# Patient Record
Sex: FEMALE | Race: BLACK OR AFRICAN AMERICAN | Employment: OTHER | URBAN - METROPOLITAN AREA
[De-identification: names, ages, dates, MRNs, and addresses within clinical notes are randomized per-mention and may not be internally consistent; named-entity substitution may affect disease eponyms.]

---

## 2017-01-09 ENCOUNTER — HOSPITAL ENCOUNTER (OUTPATIENT)
Dept: WOUND CARE | Facility: CLINIC | Age: 62
Discharge: HOME | End: 2017-01-09
Attending: PHYSICIAN ASSISTANT | Admitting: PHYSICIAN ASSISTANT
Payer: COMMERCIAL

## 2017-01-09 DIAGNOSIS — T81.89XD SURGICAL WOUND, NON HEALING, SUBSEQUENT ENCOUNTER: ICD-10-CM

## 2017-01-09 DIAGNOSIS — M25.474 SWELLING OF FOOT JOINT, RIGHT: Primary | ICD-10-CM

## 2017-01-09 PROCEDURE — 11042 DBRDMT SUBQ TIS 1ST 20SQCM/<: CPT | Performed by: PHYSICIAN ASSISTANT

## 2017-01-09 PROCEDURE — 11042 DBRDMT SUBQ TIS 1ST 20SQCM/<: CPT

## 2017-01-09 PROCEDURE — A6248 HYDROGEL DRSG GEL FILLER: HCPCS

## 2017-01-10 NOTE — PROGRESS NOTES
[facility]      HISTORY OF PRESENT ILLNESS:  Ms. Maria T Suazo returns to the [facility] for a followup visit of her right medial first metatarsal wound.  Ms. Suaoz underwent a bunionectomy on 08/30/2016.  She had a wound dehiscence following suture removal.  This wound became infected.  She was treated with IV antibiotics.  Since she was seen last, she reports the wound has improved.  She has a home nurse that assists with dressing changes.  She has recently changed to Woun'Dres gel which she changes every day.      PHYSICAL EXAMINATION:   GENERAL:  Ms. Suazo is a 61-year-old woman in no acute distress.   VITAL SIGNS:  Blood pressure 114/67, pulse 91, respirations 16, temperature 98 degrees Fahrenheit.   EXTREMITIES:  Her foot is warm with normal color.  She denies pain over the area with palpation.  The right medial foot wound measured 0.5 x 0.2 x 0 cm deep.  The exposed bone previously seen has now been covered by tissue.  There appears to be a fibrin plug in the central area of this wound.  There was no evidence of infection or purulent drainage.      PROCEDURE:  Informed consent was discussed and the wound site was identified.  Lidocaine 4% topical was applied.  Using a #15 blade and Adson pickup, the edges of the wound were sharply debrided.  The central area of the wound was then debrided to the subcutaneous level with healthy granulation tissue in the base of the wound.  There was scant bleeding with this procedure.  She tolerated it without difficulty.      ASSESSMENT:  Surgical dehiscence with subsequent infection at the right first metatarsophalangeal joint, status post right bunionectomy.      DISCUSSION:  This wound continues to make improvement.  The exposed bone is now covered with healthy, viable tissue.  There is no sign of infection.  She will continue to clean the wound daily with soap and water, followed by a dressing of Woun'Dres gel.  This will be  covered with a Band-Aid and dressings will be changed every day.  Ms. Suazo also reports having swelling in her right foot and ankle.  We discussed utilization of compression stockings and a prescription was provided for her.  She will also elevate her legs 3 times daily above her heart for 30 minutes.  Ms. Suazo was in agreement with this plan and had no further questions.  She will follow up as needed for further wound care.         TINO WOLFE PA-C             D: 2017 15:08   T: 2017 21:21   MT: JOSE      Name:     WADE SUAZO   MRN:      [id number]        Account:      [id number]   :      1955           Visit Date:   2017      Document: [id number]

## 2017-02-28 ENCOUNTER — OFFICE VISIT (OUTPATIENT)
Dept: ORTHOPEDICS | Facility: CLINIC | Age: 62
End: 2017-02-28
Payer: COMMERCIAL

## 2017-02-28 VITALS
SYSTOLIC BLOOD PRESSURE: 96 MMHG | DIASTOLIC BLOOD PRESSURE: 60 MMHG | HEIGHT: 67 IN | BODY MASS INDEX: 21.19 KG/M2 | WEIGHT: 135 LBS

## 2017-02-28 DIAGNOSIS — Z98.890 STATUS POST BUNIONECTOMY: ICD-10-CM

## 2017-02-28 DIAGNOSIS — M19.079 ARTHRITIS OF BIG TOE: Primary | ICD-10-CM

## 2017-02-28 PROCEDURE — 99213 OFFICE O/P EST LOW 20 MIN: CPT | Performed by: ORTHOPAEDIC SURGERY

## 2017-02-28 NOTE — MR AVS SNAPSHOT
"              After Visit Summary   2/28/2017    Maria T Suazo    MRN: [id number]           Patient Information     Date Of Birth          1955        Visit Information        Provider Department      2/28/2017 11:00 AM Abisai Marie MD [facility]        Today's Diagnoses     Arthritis of big toe    -  1    Status post bunionectomy           Follow-ups after your visit        Who to contact     If you have questions or need follow up information about today's clinic visit or your schedule please contact [facility] directly at [phone].  Normal or non-critical lab and imaging results will be communicated to you by We[employer], letter or phone within 4 business days after the clinic has received the results. If you do not hear from us within 7 days, please contact the clinic through Bizmore[employer] or phone. If you have a critical or abnormal lab result, we will notify you by phone as soon as possible.  Submit refill requests through [employer] or call your pharmacy and they will forward the refill request to us. Please allow 3 business days for your refill to be completed.          Additional Information About Your Visit        MyChart Information     [employer] gives you secure access to your electronic health record. If you see a primary care provider, you can also send messages to your care team and make appointments. If you have questions, please call your primary care clinic.  If you do not have a primary care provider, please call [phone] and they will assist you.        Care EveryWhere ID     This is your Care EveryWhere ID. This could be used by other organizations to access your [facility] medical records  [id number]        Your Vitals Were     Height BMI (Body Mass Index)                1.702 m (5' 7\") 21.14 kg/m2           Blood Pressure from Last 3 Encounters:   02/28/17 96/60   11/23/16 128/68   10/26/16 126/68    Weight from Last 3 Encounters:   02/28/17 " 61.2 kg (135 lb)   11/23/16 66.2 kg (146 lb)   10/26/16 66.2 kg (146 lb)              Today, you had the following     No orders found for display       Primary Care Provider Office Phone # Fax #    Ha Cook PA-C [phone]       [facility] [address]   [facility] [address]        Thank you!     Thank you for choosing [facility]  for your care. Our goal is always to provide you with excellent care. Hearing back from our patients is one way we can continue to improve our services. Please take a few minutes to complete the written survey that you may receive in the mail after your visit with us. Thank you!             Your Updated Medication List - Protect others around you: Learn how to safely use, store and throw away your medicines at www.disposemymeds.org.          This list is accurate as of: 2/28/17 12:06 PM.  Always use your most recent med list.                   Brand Name Dispense Instructions for use    ASPIRIN PO      Take 81 mg by mouth       BIOTIN PO      Take 10,000 mg by mouth daily       ibuprofen 800 MG tablet    ADVIL/MOTRIN     Take 400 mg by mouth every 8 hours as needed (headache)       lisinopril 10 MG tablet    PRINIVIL/ZESTRIL     Take 10 mg by mouth daily       MELATONIN PO      Take 3 mg by mouth nightly as needed       metFORMIN 500 MG tablet    GLUCOPHAGE     Take 500 mg by mouth 2 times daily (with meals)       NORTRIPTYLINE HCL PO      Take 20-30 mg by mouth At Bedtime       * order for DME     1 Units    Equipment being ordered: raised toilet seat.       * order for DME     1 Units    Equipment being ordered: Shower chair       * order for DME     4 Device    Equipment being ordered: Compression Socks Knee High (Closed or Open Toe) 20-30 mmhg       ROSUVASTATIN CALCIUM PO      Take 20 mg by mouth daily       venlafaxine 150 MG 24 hr capsule    EFFEXOR-XR     Take 150 mg by mouth daily       VITAMIN C &  D3/ABIODUN HIPS PO      Take by mouth daily       VITAMIN D PO      Take 1,000 mg by mouth daily       zinc 50 MG Tabs      Take 1 tablet by mouth daily as needed       * Notice:  This list has 3 medication(s) that are the same as other medications prescribed for you. Read the directions carefully, and ask your doctor or other care provider to review them with you.

## 2017-02-28 NOTE — NURSING NOTE
"Chief Complaint   Patient presents with     Foot Problems     right,        Initial BP 96/60  Ht 5' 7\" (1.702 m)  Wt 135 lb (61.2 kg)  BMI 21.14 kg/m2 Estimated body mass index is 21.14 kg/(m^2) as calculated from the following:    Height as of this encounter: 5' 7\" (1.702 m).    Weight as of this encounter: 135 lb (61.2 kg).  Medication Reconciliation: complete     Nallely Trujillo, ATC      "

## 2017-02-28 NOTE — PROGRESS NOTES
"HISTORY OF PRESENT ILLNESS:    Maria T Suazo is a 62 year old female with non insulin dependent Type II DM, S/P Rt 1st MCP bunionectomy, Met osteotomy, Cheilectomy, DOS 8/30/2016.  Present symptoms: right great toe pain with swelling, also has concerns about blisters that formed over incision after starting to wear shoes in the last few weeks. She note the incisions completely closed up about 1 week ago. She is requesting a blood test to see if she has any bone infections due to the length of time it took for the incision to heal.  Treatments tried to this point: soaking in Epsion salt water  Past Medical History: Unchanged from the visit of 8/4/2016. Please refer to that note.    REVIEW OF SYSTEMS:  CONSTITUTIONAL:  Lost of Appetite, Weight Loss NEGATIVE for fever, chills, INTEGUMENTARY/SKIN:  NEGATIVE for worrisome rashes, moles or lesions  EYES:  NEGATIVE for vision changes or irritation  ENT/MOUTH:  NEGATIVE for ear, mouth and throat problems  RESP:  NEGATIVE for significant cough or SOB  BREAST:  NEGATIVE for masses, tenderness or discharge  CV:  Hypertension, chest pain   GI:  NEGATIVE for nausea, abdominal pain, heartburn, or change in bowel habits  :  Negative   MUSCULOSKELETAL:  See HPI above  NEURO: Seizures (x1), Dizziness  ENDOCRINE:  NEGATIVE for temperature intolerance, skin/hair changes  HEME/ALLERGY/IMMUNE:  NEGATIVE for bleeding problems  PSYCHIATRIC:  Depression, Panic Attacks    PHYSICAL EXAM:  BP 96/60  Ht 5' 7\" (1.702 m)  Wt 135 lb (61.2 kg)  BMI 21.14 kg/m2  Body mass index is 21.14 kg/(m^2).   GENERAL APPEARANCE: healthy, alert and no distress   SKIN: no suspicious lesions or rashes  NEURO: Normal strength and tone, mentation intact and speech normal  VASCULAR:  good pulses, and cappillary refill   LYMPH: no lymphadenopathy   PSYCH:  mentation appears normal and affect normal/bright    MSK:  Completely healed incision at this point by secondary intention, right foot  Limited dorsiflexion of " the MTP joint Of the big toe secondary to DJD which is chronic  No focal tenderness with palpation  No drainage is noted  No significant swelling is noted  No erythema is noted  The foot alignment is very satisfactory with a good correction of the bunion    IMAGING INTERPRETATION:  None taken today     ASSESSMENT:  Status post bunion correction, right  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 Within last week or so the wound has closed completely.  Even though it has taken a long period time for that to happen, at this point, she does not have any indication of having ongoing infection.  Further observation is most appropriate.  Follow-up as needed.           Abisai Marie MD  Dept. Orthopedic Surgery  [facility]       Disclaimer: This note consists of symbols derived from keyboarding, dictation and/or voice recognition software. As a result, there may be errors in the script that have gone undetected. Please consider this when interpreting information found in this chart.

## 2017-04-13 PROCEDURE — 96361 HYDRATE IV INFUSION ADD-ON: CPT

## 2017-04-13 PROCEDURE — 96374 THER/PROPH/DIAG INJ IV PUSH: CPT

## 2017-04-13 PROCEDURE — 25000125 ZZHC RX 250: Performed by: EMERGENCY MEDICINE

## 2017-04-13 PROCEDURE — 99285 EMERGENCY DEPT VISIT HI MDM: CPT | Mod: 25

## 2017-04-13 PROCEDURE — 96375 TX/PRO/DX INJ NEW DRUG ADDON: CPT

## 2017-04-13 RX ORDER — ONDANSETRON 4 MG/1
4 TABLET, ORALLY DISINTEGRATING ORAL ONCE
Status: COMPLETED | OUTPATIENT
Start: 2017-04-13 | End: 2017-04-13

## 2017-04-13 RX ADMIN — ONDANSETRON 4 MG: 4 TABLET, ORALLY DISINTEGRATING ORAL at 22:44

## 2017-04-14 ENCOUNTER — HOSPITAL ENCOUNTER (EMERGENCY)
Facility: CLINIC | Age: 62
Discharge: HOME | End: 2017-04-14
Attending: EMERGENCY MEDICINE | Admitting: EMERGENCY MEDICINE
Payer: COMMERCIAL

## 2017-04-14 ENCOUNTER — APPOINTMENT (OUTPATIENT)
Dept: CT IMAGING | Facility: CLINIC | Age: 62
End: 2017-04-14
Attending: EMERGENCY MEDICINE
Payer: COMMERCIAL

## 2017-04-14 VITALS
BODY MASS INDEX: 21.35 KG/M2 | RESPIRATION RATE: 19 BRPM | SYSTOLIC BLOOD PRESSURE: 133 MMHG | HEART RATE: 78 BPM | WEIGHT: 136 LBS | OXYGEN SATURATION: 100 % | DIASTOLIC BLOOD PRESSURE: 75 MMHG | HEIGHT: 67 IN | TEMPERATURE: 97.9 F

## 2017-04-14 DIAGNOSIS — R11.2 NON-INTRACTABLE VOMITING WITH NAUSEA, UNSPECIFIED VOMITING TYPE: ICD-10-CM

## 2017-04-14 DIAGNOSIS — R51.9 NONINTRACTABLE EPISODIC HEADACHE, UNSPECIFIED HEADACHE TYPE: ICD-10-CM

## 2017-04-14 LAB
ALBUMIN SERPL-MCNC: 3.8 G/DL (ref 3.4–5)
ALP SERPL-CCNC: 93 U/L (ref 40–150)
ALT SERPL W P-5'-P-CCNC: 22 U/L (ref 0–50)
ANION GAP SERPL CALCULATED.3IONS-SCNC: 7 MMOL/L (ref 3–14)
AST SERPL W P-5'-P-CCNC: 19 U/L (ref 0–45)
BASOPHILS # BLD AUTO: 0 10E9/L (ref 0–0.2)
BASOPHILS NFR BLD AUTO: 0.3 %
BILIRUB SERPL-MCNC: 0.4 MG/DL (ref 0.2–1.3)
BUN SERPL-MCNC: 12 MG/DL (ref 7–30)
CALCIUM SERPL-MCNC: 9.5 MG/DL (ref 8.5–10.1)
CHLORIDE SERPL-SCNC: 101 MMOL/L (ref 94–109)
CO2 SERPL-SCNC: 30 MMOL/L (ref 20–32)
CREAT SERPL-MCNC: 0.79 MG/DL (ref 0.52–1.04)
DIFFERENTIAL METHOD BLD: ABNORMAL
EOSINOPHIL # BLD AUTO: 0.1 10E9/L (ref 0–0.7)
EOSINOPHIL NFR BLD AUTO: 2 %
ERYTHROCYTE [DISTWIDTH] IN BLOOD BY AUTOMATED COUNT: 13.5 % (ref 10–15)
GFR SERPL CREATININE-BSD FRML MDRD: 73 ML/MIN/1.7M2
GLUCOSE SERPL-MCNC: 89 MG/DL (ref 70–99)
HCT VFR BLD AUTO: 40.3 % (ref 35–47)
HGB BLD-MCNC: 13.6 G/DL (ref 11.7–15.7)
IMM GRANULOCYTES # BLD: 0 10E9/L (ref 0–0.4)
IMM GRANULOCYTES NFR BLD: 0 %
INTERPRETATION ECG - MUSE: NORMAL
LIPASE SERPL-CCNC: 88 U/L (ref 73–393)
LYMPHOCYTES # BLD AUTO: 1.6 10E9/L (ref 0.8–5.3)
LYMPHOCYTES NFR BLD AUTO: 43.9 %
MCH RBC QN AUTO: 31.2 PG (ref 26.5–33)
MCHC RBC AUTO-ENTMCNC: 33.7 G/DL (ref 31.5–36.5)
MCV RBC AUTO: 92 FL (ref 78–100)
MONOCYTES # BLD AUTO: 0.3 10E9/L (ref 0–1.3)
MONOCYTES NFR BLD AUTO: 8.5 %
NEUTROPHILS # BLD AUTO: 1.6 10E9/L (ref 1.6–8.3)
NEUTROPHILS NFR BLD AUTO: 45.3 %
NRBC # BLD AUTO: 0 10*3/UL
NRBC BLD AUTO-RTO: 0 /100
PLATELET # BLD AUTO: 252 10E9/L (ref 150–450)
POTASSIUM SERPL-SCNC: 4 MMOL/L (ref 3.4–5.3)
PROT SERPL-MCNC: 8.1 G/DL (ref 6.8–8.8)
RBC # BLD AUTO: 4.36 10E12/L (ref 3.8–5.2)
SODIUM SERPL-SCNC: 138 MMOL/L (ref 133–144)
TROPONIN I SERPL-MCNC: NORMAL UG/L (ref 0–0.04)
WBC # BLD AUTO: 3.5 10E9/L (ref 4–11)

## 2017-04-14 PROCEDURE — 84484 ASSAY OF TROPONIN QUANT: CPT | Performed by: EMERGENCY MEDICINE

## 2017-04-14 PROCEDURE — 80053 COMPREHEN METABOLIC PANEL: CPT | Performed by: EMERGENCY MEDICINE

## 2017-04-14 PROCEDURE — 25000128 H RX IP 250 OP 636: Performed by: EMERGENCY MEDICINE

## 2017-04-14 PROCEDURE — 70450 CT HEAD/BRAIN W/O DYE: CPT

## 2017-04-14 PROCEDURE — 85025 COMPLETE CBC W/AUTO DIFF WBC: CPT | Performed by: EMERGENCY MEDICINE

## 2017-04-14 PROCEDURE — 83690 ASSAY OF LIPASE: CPT | Performed by: EMERGENCY MEDICINE

## 2017-04-14 RX ORDER — METOCLOPRAMIDE HYDROCHLORIDE 5 MG/ML
10 INJECTION INTRAMUSCULAR; INTRAVENOUS ONCE
Status: COMPLETED | OUTPATIENT
Start: 2017-04-14 | End: 2017-04-14

## 2017-04-14 RX ORDER — ONDANSETRON 4 MG/1
4 TABLET, ORALLY DISINTEGRATING ORAL EVERY 8 HOURS PRN
Qty: 10 TABLET | Refills: 0 | Status: SHIPPED | OUTPATIENT
Start: 2017-04-14 | End: 2017-04-17

## 2017-04-14 RX ORDER — DIPHENHYDRAMINE HYDROCHLORIDE 50 MG/ML
25 INJECTION INTRAMUSCULAR; INTRAVENOUS ONCE
Status: COMPLETED | OUTPATIENT
Start: 2017-04-14 | End: 2017-04-14

## 2017-04-14 RX ORDER — KETOROLAC TROMETHAMINE 15 MG/ML
15 INJECTION, SOLUTION INTRAMUSCULAR; INTRAVENOUS ONCE
Status: COMPLETED | OUTPATIENT
Start: 2017-04-14 | End: 2017-04-14

## 2017-04-14 RX ORDER — METOCLOPRAMIDE 10 MG/1
10 TABLET ORAL 4 TIMES DAILY PRN
Qty: 20 TABLET | Refills: 0 | Status: SHIPPED | OUTPATIENT
Start: 2017-04-14

## 2017-04-14 RX ADMIN — SODIUM CHLORIDE 1000 ML: 9 INJECTION, SOLUTION INTRAVENOUS at 02:30

## 2017-04-14 RX ADMIN — DIPHENHYDRAMINE HYDROCHLORIDE 25 MG: 50 INJECTION, SOLUTION INTRAMUSCULAR; INTRAVENOUS at 02:30

## 2017-04-14 RX ADMIN — KETOROLAC TROMETHAMINE 15 MG: 15 INJECTION, SOLUTION INTRAMUSCULAR; INTRAVENOUS at 02:35

## 2017-04-14 RX ADMIN — METOCLOPRAMIDE 10 MG: 5 INJECTION, SOLUTION INTRAMUSCULAR; INTRAVENOUS at 02:33

## 2017-04-14 ASSESSMENT — ENCOUNTER SYMPTOMS
SHORTNESS OF BREATH: 1
WEAKNESS: 0
VOMITING: 1
LIGHT-HEADEDNESS: 1
HEADACHES: 1
DIARRHEA: 1
PALPITATIONS: 1
NAUSEA: 1
ABDOMINAL PAIN: 1

## 2017-04-14 NOTE — ED AVS SNAPSHOT
Emergency Department    [address] [place] [address]    Phone:  [phone]    Fax:  [phone]                                       Maria T Suazo   MRN: [id number]    Department:   Emergency Department   Date of Visit:  4/13/2017           After Visit Summary Signature Page     I have received my discharge instructions, and my questions have been answered. I have discussed any challenges I see with this plan with the nurse or doctor.    ..........................................................................................................................................  Patient/Patient Representative Signature      ..........................................................................................................................................  Patient Representative Print Name and Relationship to Patient    ..................................................               ................................................  Date                                            Time    ..........................................................................................................................................  Reviewed by Signature/Title    ...................................................              ..............................................  Date                                                            Time

## 2017-04-14 NOTE — ED AVS SNAPSHOT
Emergency Department    [address] [facility] [address]    Phone:  [phone]    Fax:  [phone]                                       Maria T Suazo   MRN: [id number]    Department:   Emergency Department   Date of Visit:  4/13/2017           Patient Information     Date Of Birth          1955        Your diagnoses for this visit were:     Nonintractable episodic headache, unspecified headache type     Non-intractable vomiting with nausea, unspecified vomiting type        You were seen by Venu Shepherd MD.      Follow-up Information     Follow up with Betsy Samaniego MD. Schedule an appointment as soon as possible for a visit in 1 week.    Specialty:  Family Practice    Why:  or your primary doctor    Contact information:    [facility]  [address] [address] [phone]          Follow up with  Emergency Department.    Specialty:  EMERGENCY MEDICINE    Why:  If symptoms worsen    Contact information:    [address] [facility] [address] [phone]        Discharge Instructions          * HEADACHE [unspecified]    The cause of your headache today is not clear, but it does not appear to be the sign of any serious illness.  Under stress, some people tense the muscles of their shoulder, neck and scalp without knowing it. If this condition lasts long enough, a TENSION HEADACHE can occur.  A MIGRAINE HEADACHE is caused by changes in blood flow to the brain. It can be mild or severe. A migraine attack may be triggered by emotional stress, hormone changes during the menstrual cycle, oral contraceptives, alcohol use, certain foods containing tyramine, eye strain, weather changes, missing meals, lack of sleep or oversleeping.  Other causes of headache include a viral illness, sinus, ear or throat infection, dental pain and TMJ (jaw joint) pain.  HOME CARE:    If you were given pain medicine for this headache, do not drive yourself  home. Arrange for a ride, instead. When you get home, try to sleep. You should feel much better when you wake up.    If you are having nausea or vomiting, follow a light diet until your headache is relieved.    If you have a migraine type headache, use sunglasses when in the daylight or around bright indoor lighting until symptoms improve. Bright glaring light can worsen this kind of headache.  FOLLOW UP with your doctor if the headache is not better within the next 24 hours. If you have frequent headaches you should discuss a treatment plan with your primary care doctor. By being aware of the earliest signs of headache, and starting treatment right away, you may be able to stop the pain yourself.  GET PROMPT MEDICAL ATTENTION if any of the following occur:    Worsening of your head pain or no improvement within 24 hours    Repeated vomiting (unable to keep liquids down)    Fever over 101 F (38.3 C)    Stiff neck    Extreme drowsiness, confusion or fainting    Weakness of an arm or leg or one side of the face    Difficulty with speech or vision    [address] [place] [address]. All rights reserved. This information is not intended as a substitute for professional medical care. Always follow your healthcare professional's instructions.      Diet for Vomiting or Diarrhea (Adult)    If your symptoms return or get worse after eating certain foods listed below, you should stop eating them until your symptoms ease and you feel better.  Once the vomiting stops, then follow the steps below.   During the first 12 to 24 hours  During the first 12 to 24 hours, follow this diet:    Beverages. Plain water, sport drinks like electrolyte solutions, soft drinks without caffeine, mineral water (plain or flavored), clear fruit juices, and decaffeinated tea and coffee.    Soups. Clear broth, consommé, and bouillon.    Desserts. Plain gelatin, popsicles, and fruit juice bars. As you feel better,  "you may add 6 to 8 ounces of yogurt per day. If you have diarrhea, don't have foods or beverages that contain sugar, high-fructose corn syrup, or sugar alcohols.  During the next 24 hours  During the next 24 hours you may add the following to the above:    Hot cereal, plain toast, bread, rolls, and crackers    Plain noodles, rice, mashed potatoes, and chicken noodle or rice soup    Unsweetened canned fruit (but not pineapple) and bananas  Don't have more than 15 grams of fat a day. Do this by staying away from margarine, butter, oils, mayonnaise, sauces, gravies, fried foods, peanut butter, meat, poultry, and fish.  Don't eat much fiber. Stay away from raw or cooked vegetables, fresh fruits (except bananas), and bran cereals.  Limit how much caffeine and chocolate you have. Do not use any spices or seasonings except salt.  During the next 24 hours  Gradually go back to your normal diet, as you feel better and your symptoms ease.    4043-7183 [employer]. [address] [place] [address]. All rights reserved. This information is not intended as a substitute for professional medical care. Always follow your healthcare professional's instructions.           * VOMITING [6yr-Adult]  Vomiting is a common symptom that may be due to different causes. These include gastroenteritis (\"stomach-flu\"), food poisoning and gastritis. There are other more serious causes of vomiting which may be hard to diagnose early in the illness. Therefore, it is important to watch for the warning signs listed below.  The main danger from repeated vomiting is \"dehydration\". This is due to excess loss of water and minerals from the body. When this occurs, body fluids must be replaced.`  HOME CARE:    If symptoms are severe, rest at home for the next 24 hours.    You may use acetaminophen (Tylenol) 650-1000 mg every 6 hours to control fever, unless another medicine was prescribed. [ NOTE : If you have chronic liver disease, " talk with your doctor before using acetaminophen.] (Aspirin should never be used in anyone under 18 years of age who is ill with a fever. It may cause severe liver damage.)    Avoid tobacco and alcohol use, which may worsen your symptoms.    If medicines for vomiting were prescribed, take as directed.  DURING THE FIRST 12-24 HOURS follow the diet below. Try to take frequent small sips even if you vomit occasionally:    FRUIT JUICES: Apple, grape juice, clear fruit drinks, electrolyte replacement and sports drinks.    BEVERAGES: Sport drinks such as Gatorade, soft drinks without caffeine; mineral water (plain or flavored), decaffeinated tea and coffee.    SOUPS: Clear broth, consommé and bouillon    DESSERTS: Plain gelatin (Jell-O), popsicles and fruit juice bars.  DURING THE NEXT 24 HOURS you may add the following to the above:    Hot cereal, plain toast, bread, rolls, crackers    Plain noodles, rice, mashed potatoes, chicken noodle or rice soup    Unsweetened canned fruit (avoid pineapple), bananas    Avoid dairy products    Limit caffeine and chocolate. No spices or seasonings except salt.  DURING THE NEXT 24 HOURS  Slowly go back to a normal diet, as you feel better and your symptoms lessen.  FOLLOW UP with your doctor as advised if you are not improving over the next 2-3 days.  GET PROMPT MEDICAL ATTENTION if any of the following occur:    Constant abdominal pain that stays in the same spot or gets worse    Continued vomiting (unable to keep liquids down) for 24 hours    Frequent diarrhea (more than 5 times a day); blood (red or black color) in diarrhea    No urine output for 12 hours or extreme thirst    Weakness, dizziness or fainting    Unusually drowsy or confused    Fever over 101.0  F (38.3  C) for more than 3 days    Yellow color of the eyes or skin    5336-4466 [facility], [address] [place] [address], [place] [address]. All rights reserved. This information is not intended as a substitute for  professional medical care. Always follow your healthcare professional's instructions.      24 Hour Appointment Hotline       To make an appointment at any [facility], call [phone] ([phone]). If you don't have a family doctor or clinic, we will help you find one. [facility] are conveniently located to serve the needs of you and your family.             Review of your medicines      START taking        Dose / Directions Last dose taken    metoclopramide 10 MG tablet   Commonly known as:  REGLAN   Dose:  10 mg   Quantity:  20 tablet        Take 1 tablet (10 mg) by mouth 4 times daily as needed   Refills:  0        ondansetron 4 MG ODT tab   Commonly known as:  ZOFRAN ODT   Dose:  4 mg   Quantity:  10 tablet        Take 1 tablet (4 mg) by mouth every 8 hours as needed for nausea   Refills:  0          Our records show that you are taking the medicines listed below. If these are incorrect, please call your family doctor or clinic.        Dose / Directions Last dose taken    ASPIRIN PO   Dose:  81 mg        Take 81 mg by mouth   Refills:  0        BIOTIN PO   Dose:  49980 mg        Take 10,000 mg by mouth daily   Refills:  0        ibuprofen 800 MG tablet   Commonly known as:  ADVIL/MOTRIN   Dose:  400 mg        Take 400 mg by mouth every 8 hours as needed (headache)   Refills:  0        lisinopril 10 MG tablet   Commonly known as:  PRINIVIL/ZESTRIL   Dose:  10 mg        Take 10 mg by mouth daily   Refills:  0        MELATONIN PO   Dose:  3 mg        Take 3 mg by mouth nightly as needed   Refills:  0        metFORMIN 500 MG tablet   Commonly known as:  GLUCOPHAGE   Dose:  500 mg        Take 500 mg by mouth 2 times daily (with meals)   Refills:  0        NORTRIPTYLINE HCL PO   Dose:  20-30 mg        Take 20-30 mg by mouth At Bedtime   Refills:  0        * order for DME   Quantity:  1 Units        Equipment being ordered: raised toilet seat.   Refills:  0        * order for DME   Quantity:  1 Units         Equipment being ordered: Shower chair   Refills:  0        * order for DME   Quantity:  4 Device        Equipment being ordered: Compression Socks Knee High (Closed or Open Toe) 20-30 mmhg   Refills:  0        ROSUVASTATIN CALCIUM PO   Dose:  20 mg        Take 20 mg by mouth daily   Refills:  0        venlafaxine 150 MG 24 hr capsule   Commonly known as:  EFFEXOR-XR   Dose:  150 mg        Take 150 mg by mouth daily   Refills:  0        VITAMIN C & D3/ABIODUN HIPS PO        Take by mouth daily   Refills:  0        VITAMIN D PO   Dose:  1000 mg        Take 1,000 mg by mouth daily   Refills:  0        zinc 50 MG Tabs   Dose:  1 tablet        Take 1 tablet by mouth daily as needed   Refills:  0        * Notice:  This list has 3 medication(s) that are the same as other medications prescribed for you. Read the directions carefully, and ask your doctor or other care provider to review them with you.            Prescriptions were sent or printed at these locations (2 Prescriptions)                   Other Prescriptions                Printed at Department/Unit printer (2 of 2)         metoclopramide (REGLAN) 10 MG tablet               ondansetron (ZOFRAN ODT) 4 MG ODT tab                Procedures and tests performed during your visit     CBC with platelets + differential    Comprehensive metabolic panel    EKG 12 lead    Head CT w/o contrast    Lipase    Troponin I (now)      Orders Needing Specimen Collection     None      Pending Results     Date and Time Order Name Status Description    4/14/2017 0107 EKG 12 lead Preliminary             Pending Culture Results     No orders found from 4/12/2017 to 4/15/2017.            Test Results From Your Hospital Stay        4/14/2017  3:20 AM      Narrative     CT HEAD W/O CONTRAST  4/14/2017 2:50 AM     HISTORY: Headache, nausea, history of aneurysm on right.    TECHNIQUE: Axial images of the head and coronal reformations without  I.V. contrast material. Radiation dose for  this scan was reduced using  automated exposure control, adjustment of the mA and/or kV according  to patient size, or iterative reconstruction technique.    COMPARISON: None.    FINDINGS: No intracranial hemorrhage, mass or mass effect. No acute  infarct identified. No shift of midline structures. The ventricles are  symmetric. Calvarium is intact. Visualized paranasal sinuses are  clear.        Impression     IMPRESSION: No acute findings.     ANT ANTOINE MD         4/14/2017  2:34 AM      Component Results     Component Value Ref Range & Units Status    WBC 3.5 (L) 4.0 - 11.0 10e9/L Final    RBC Count 4.36 3.8 - 5.2 10e12/L Final    Hemoglobin 13.6 11.7 - 15.7 g/dL Final    Hematocrit 40.3 35.0 - 47.0 % Final    MCV 92 78 - 100 fl Final    MCH 31.2 26.5 - 33.0 pg Final    MCHC 33.7 31.5 - 36.5 g/dL Final    RDW 13.5 10.0 - 15.0 % Final    Platelet Count 252 150 - 450 10e9/L Final    Diff Method Automated Method  Final    % Neutrophils 45.3 % Final    % Lymphocytes 43.9 % Final    % Monocytes 8.5 % Final    % Eosinophils 2.0 % Final    % Basophils 0.3 % Final    % Immature Granulocytes 0.0 % Final    Nucleated RBCs 0 0 /100 Final    Absolute Neutrophil 1.6 1.6 - 8.3 10e9/L Final    Absolute Lymphocytes 1.6 0.8 - 5.3 10e9/L Final    Absolute Monocytes 0.3 0.0 - 1.3 10e9/L Final    Absolute Eosinophils 0.1 0.0 - 0.7 10e9/L Final    Absolute Basophils 0.0 0.0 - 0.2 10e9/L Final    Abs Immature Granulocytes 0.0 0 - 0.4 10e9/L Final    Absolute Nucleated RBC 0.0  Final         4/14/2017  2:50 AM      Component Results     Component Value Ref Range & Units Status    Sodium 138 133 - 144 mmol/L Final    Potassium 4.0 3.4 - 5.3 mmol/L Final    Chloride 101 94 - 109 mmol/L Final    Carbon Dioxide 30 20 - 32 mmol/L Final    Anion Gap 7 3 - 14 mmol/L Final    Glucose 89 70 - 99 mg/dL Final    Urea Nitrogen 12 7 - 30 mg/dL Final    Creatinine 0.79 0.52 - 1.04 mg/dL Final    GFR Estimate 73 >60 mL/min/1.7m2 Final    Non   GFR Calc    GFR Estimate If Black 89 >60 mL/min/1.7m2 Final    African American GFR Calc    Calcium 9.5 8.5 - 10.1 mg/dL Final    Bilirubin Total 0.4 0.2 - 1.3 mg/dL Final    Albumin 3.8 3.4 - 5.0 g/dL Final    Protein Total 8.1 6.8 - 8.8 g/dL Final    Alkaline Phosphatase 93 40 - 150 U/L Final    ALT 22 0 - 50 U/L Final    AST 19 0 - 45 U/L Final         4/14/2017  2:45 AM      Component Results     Component Value Ref Range & Units Status    Lipase 88 73 - 393 U/L Final         4/14/2017  2:50 AM      Component Results     Component Value Ref Range & Units Status    Troponin I ES  0.000 - 0.045 ug/L Final    <0.015  The 99th percentile for upper reference range is 0.045 ug/L.  Troponin values in   the range of 0.045 - 0.120 ug/L may be associated with risks of adverse   clinical events.                  Clinical Quality Measure: Blood Pressure Screening     Your blood pressure was checked while you were in the emergency department today. The last reading we obtained was  BP: 133/75 . Please read the guidelines below about what these numbers mean and what you should do about them.  If your systolic blood pressure (the top number) is less than 120 and your diastolic blood pressure (the bottom number) is less than 80, then your blood pressure is normal. There is nothing more that you need to do about it.  If your systolic blood pressure (the top number) is 120-139 or your diastolic blood pressure (the bottom number) is 80-89, your blood pressure may be higher than it should be. You should have your blood pressure rechecked within a year by a primary care provider.  If your systolic blood pressure (the top number) is 140 or greater or your diastolic blood pressure (the bottom number) is 90 or greater, you may have high blood pressure. High blood pressure is treatable, but if left untreated over time it can put you at risk for heart attack, stroke, or kidney failure. You should have your blood pressure  rechecked by a primary care provider within the next 4 weeks.  If your provider in the emergency department today gave you specific instructions to follow-up with your doctor or provider even sooner than that, you should follow that instruction and not wait for up to 4 weeks for your follow-up visit.        Thank you for choosing [facility]       Thank you for choosing [facility] for your care. Our goal is always to provide you with excellent care. Hearing back from our patients is one way we can continue to improve our services. Please take a few minutes to complete the written survey that you may receive in the mail after you visit with us. Thank you!        [facility] Information     [facility] gives you secure access to your electronic health record. If you see a primary care provider, you can also send messages to your care team and make appointments. If you have questions, please call your primary care clinic.  If you do not have a primary care provider, please call [phone] and they will assist you.        Care EveryWhere ID     This is your Care EveryWhere ID. This could be used by other organizations to access your [facility] medical records  [id number]        After Visit Summary       This is your record. Keep this with you and show to your community pharmacist(s) and doctor(s) at your next visit.

## 2017-04-14 NOTE — ED PROVIDER NOTES
"  History     Chief Complaint:  Headache       HPI   Maria T Suazo is a 62 year old female who presents to the emergency department today for evaluation of headache. The patient presents with various complaints. Her first complaint is a sudden onset of headache that has been constant for the last 3 days. She notes that she had tinnitus in the past that has now resolved. Patient was reading a book when her headache started. Patient also complains of abdominal pain accompanied with loose stool, nausea and vomiting. She reports a white, foamy emesis. Patient also reports that she has not been able to eat due to her loose stools and vomiting. The patient also complains of left sided ear pain accompanied with a \"ringing and hissing\" sound. Patient complains of trouble breathing, \"flutters and palpitations\", visual disturbance, lightheadedness, and pulsating discomfort in her left lower extremity. Patient notes that she \"internally feels cold but her skin feels like its burning up.\" Patient notes that all of her symptoms worsened last night and started around the same time. Patient denies any problems with balance and gait, weakness or numbness in her arms and legs.     Allergies:  No Known Drug Allergies    Medications:    Rosuvastatin   Biotin   Melatonin   Zinc  Asprin   Nortriptyline   Lisinopril   Glucophage      Past Medical History:    Aneurysm, cerebral  Complication of anesthesia  Diabetes (H)  High cholesterol  History of blood transfusion  Hypertension  Hypertension  Migraines  Seizures (H)  Sleep apnea  Asthma     Past Surgical History:    BUNIONECTOMY  LUMPECTOMY BREAST  ORTHOPEDIC SURGERY  OSTEOTOMY FOOT    Family History:    History reviewed. No pertinent family history.     Social History:  The patient presents alone.  Smoking Status: Never smoker  Smokeless Tobacco: No  Alcohol Use: No  Marital Status:   [2]     Review of Systems   HENT: Negative for tinnitus.    Eyes: Positive for visual " "disturbance.   Respiratory: Positive for shortness of breath.    Cardiovascular: Positive for palpitations.   Gastrointestinal: Positive for abdominal pain, diarrhea, nausea and vomiting.   Musculoskeletal: Negative for gait problem.   Neurological: Positive for light-headedness and headaches. Negative for weakness.   All other systems reviewed and are negative.    Physical Exam   Vitals:  Patient Vitals for the past 24 hrs:   BP Temp Temp src Pulse Resp SpO2 Height Weight   04/13/17 2237 143/80 97.9  F (36.6  C) Temporal 59 16 96 % 1.702 m (5' 7\") 61.7 kg (136 lb)        Physical Exam    Constitutional:  Appears well-developed and well-nourished. Cooperative. looks uncomfortable  HENT:   Head:    Atraumatic.   Mouth/Throat:   Oropharynx is without erythema or exudate and mucous     membranes are moist.   Eyes:    Conjunctivae normal and EOM are normal. Photophobia present     Pupils are equal, round, and reactive to light.   Neck:    Normal range of motion. Neck supple. no nuchal rigidity  Cardiovascular:  Normal rate, regular rhythm, normal heart sounds and radial and    dorsalis pedis pulses are 2+ and symmetric.    Pulmonary/Chest:  Effort normal and breath sounds normal.   Abdominal:   Soft. Bowel sounds are normal.      No splenomegaly or hepatomegaly. No tenderness. No rebound.   Musculoskeletal:  Normal range of motion. No edema and no tenderness.   Neurological:  Alert. Normal strength. No cranial nerve deficit. GCS 15.  Skin:    Skin is warm and dry.   Psychiatric:   Normal mood and affect.     Emergency Department Course     ECG:  ECG taken at 0112, ECG read at 0019  Sinus rhythm  Voltage criteria for left ventricular hypertrophy  ST elevation, consider early repolarization  Abnormal ECG  Rate 74 bpm. WA interval 174. QRS duration 82. QT/QTc 390/432. P-R-T axes 16 40 61.      Imaging:  Radiology findings were communicated with the patient who voiced understanding of the findings.    Head CT w/o " contrast:  No acute findings   Reading per radiology      Laboratory:  Laboratory findings were communicated with the patient who voiced understanding of the findings.    CBC: WBC: 3.5(L) o/w WNL. (HGB 13.6, )     CMP: AWNL (Creatinine: 0.79)    Lipase: 88    Troponin (Collected 0117): <0.015    Interventions:  0230- NS 1000 mL IV  0230- Benadryl 25 mg IV  0235- Toradol 15 mg IV  0233- Reglan 10 mg IV  2244- Zofran 4 mg IV      Emergency Department Course:  Nursing notes and vitals reviewed.  I performed an exam of the patient as documented above.     IV was inserted and blood was drawn for laboratory testing, results above.    The patient was sent for a CT while in the emergency department, results above.     I discussed the treatment plan with the patient. They expressed understanding of this plan and consented to discharge.    I personally reviewed the laboratory results with the Patient and answered all related questions prior to discharge.    Impression & Plan      Medical Decision Making:  Maria T Suazo is a 62 year old female who presents with a headache. She has history of migraines.  I considered a broad differential diagnosis for this patient including tension, migraine, analgesic rebound, occipital neuralgia, etc.  I also considered other less common but serious causes considered included meningitis, encephalitis, subarachnoid bleed, temporal arteritis, stroke, tumor, etc.  I obtained a CT and this was done because she reports history of aneurism. Headache resolved with Reglan, Toradol, and Benadryl. Her symptoms are consistent with migraine as she has a history of migraines.    CBC with differential and metabolic panel and lipase were checked because the patient reports persistent vomiting. These were all unremarkable. She was given a liter of fluids. She also described episodes of palpitations and feeling lightheaded. EKG looked normal and she remained in sinus rhythm on the monitor.    Repeat  abdominal exam remains soft and non tender.     After medications her headaches resolved completely as did her nausea and vomiting. She tolerated PO challenge.  Her questions were answered and she feels improved after above interventions in ED.  Supportive outpatient management is therefore indicated.  Headache precautions given for home.          Diagnosis:    ICD-10-CM    1. Nonintractable episodic headache, unspecified headache type R51    2. Non-intractable vomiting with nausea, unspecified vomiting type R11.2          Disposition:   The patient was discharged.    Discharge Medications:  New Prescriptions    METOCLOPRAMIDE (REGLAN) 10 MG TABLET    Take 1 tablet (10 mg) by mouth 4 times daily as needed    ONDANSETRON (ZOFRAN ODT) 4 MG ODT TAB    Take 1 tablet (4 mg) by mouth every 8 hours as needed for nausea       Scribe Disclosure:  Arpit ESTES, am serving as a scribe at 1:21 AM on 4/14/2017 to document services personally performed by Venu Shepherd MD, based on my observations and the provider's statements to me.    4/13/2017    EMERGENCY DEPARTMENT       Venu Shepherd MD  04/18/17 8495

## 2017-04-14 NOTE — DISCHARGE INSTRUCTIONS
* HEADACHE [unspecified]    The cause of your headache today is not clear, but it does not appear to be the sign of any serious illness.  Under stress, some people tense the muscles of their shoulder, neck and scalp without knowing it. If this condition lasts long enough, a TENSION HEADACHE can occur.  A MIGRAINE HEADACHE is caused by changes in blood flow to the brain. It can be mild or severe. A migraine attack may be triggered by emotional stress, hormone changes during the menstrual cycle, oral contraceptives, alcohol use, certain foods containing tyramine, eye strain, weather changes, missing meals, lack of sleep or oversleeping.  Other causes of headache include a viral illness, sinus, ear or throat infection, dental pain and TMJ (jaw joint) pain.  HOME CARE:    If you were given pain medicine for this headache, do not drive yourself home. Arrange for a ride, instead. When you get home, try to sleep. You should feel much better when you wake up.    If you are having nausea or vomiting, follow a light diet until your headache is relieved.    If you have a migraine type headache, use sunglasses when in the daylight or around bright indoor lighting until symptoms improve. Bright glaring light can worsen this kind of headache.  FOLLOW UP with your doctor if the headache is not better within the next 24 hours. If you have frequent headaches you should discuss a treatment plan with your primary care doctor. By being aware of the earliest signs of headache, and starting treatment right away, you may be able to stop the pain yourself.  GET PROMPT MEDICAL ATTENTION if any of the following occur:    Worsening of your head pain or no improvement within 24 hours    Repeated vomiting (unable to keep liquids down)    Fever over 101 F (38.3 C)    Stiff neck    Extreme drowsiness, confusion or fainting    Weakness of an arm or leg or one side of the face    Difficulty with speech or vision    8272-9673 Laila Kelley, [address]  [place] [address]. All rights reserved. This information is not intended as a substitute for professional medical care. Always follow your healthcare professional's instructions.      Diet for Vomiting or Diarrhea (Adult)    If your symptoms return or get worse after eating certain foods listed below, you should stop eating them until your symptoms ease and you feel better.  Once the vomiting stops, then follow the steps below.   During the first 12 to 24 hours  During the first 12 to 24 hours, follow this diet:    Beverages. Plain water, sport drinks like electrolyte solutions, soft drinks without caffeine, mineral water (plain or flavored), clear fruit juices, and decaffeinated tea and coffee.    Soups. Clear broth, consommé, and bouillon.    Desserts. Plain gelatin, popsicles, and fruit juice bars. As you feel better, you may add 6 to 8 ounces of yogurt per day. If you have diarrhea, don't have foods or beverages that contain sugar, high-fructose corn syrup, or sugar alcohols.  During the next 24 hours  During the next 24 hours you may add the following to the above:    Hot cereal, plain toast, bread, rolls, and crackers    Plain noodles, rice, mashed potatoes, and chicken noodle or rice soup    Unsweetened canned fruit (but not pineapple) and bananas  Don't have more than 15 grams of fat a day. Do this by staying away from margarine, butter, oils, mayonnaise, sauces, gravies, fried foods, peanut butter, meat, poultry, and fish.  Don't eat much fiber. Stay away from raw or cooked vegetables, fresh fruits (except bananas), and bran cereals.  Limit how much caffeine and chocolate you have. Do not use any spices or seasonings except salt.  During the next 24 hours  Gradually go back to your normal diet, as you feel better and your symptoms ease.    1044-4057 [employer]. [address] [place] [address]. All rights reserved. This information is not intended as a substitute  "for professional medical care. Always follow your healthcare professional's instructions.           * VOMITING [6yr-Adult]  Vomiting is a common symptom that may be due to different causes. These include gastroenteritis (\"stomach-flu\"), food poisoning and gastritis. There are other more serious causes of vomiting which may be hard to diagnose early in the illness. Therefore, it is important to watch for the warning signs listed below.  The main danger from repeated vomiting is \"dehydration\". This is due to excess loss of water and minerals from the body. When this occurs, body fluids must be replaced.`  HOME CARE:    If symptoms are severe, rest at home for the next 24 hours.    You may use acetaminophen (Tylenol) 650-1000 mg every 6 hours to control fever, unless another medicine was prescribed. [ NOTE : If you have chronic liver disease, talk with your doctor before using acetaminophen.] (Aspirin should never be used in anyone under 18 years of age who is ill with a fever. It may cause severe liver damage.)    Avoid tobacco and alcohol use, which may worsen your symptoms.    If medicines for vomiting were prescribed, take as directed.  DURING THE FIRST 12-24 HOURS follow the diet below. Try to take frequent small sips even if you vomit occasionally:    FRUIT JUICES: Apple, grape juice, clear fruit drinks, electrolyte replacement and sports drinks.    BEVERAGES: Sport drinks such as Gatorade, soft drinks without caffeine; mineral water (plain or flavored), decaffeinated tea and coffee.    SOUPS: Clear broth, consommé and bouillon    DESSERTS: Plain gelatin (Jell-O), popsicles and fruit juice bars.  DURING THE NEXT 24 HOURS you may add the following to the above:    Hot cereal, plain toast, bread, rolls, crackers    Plain noodles, rice, mashed potatoes, chicken noodle or rice soup    Unsweetened canned fruit (avoid pineapple), bananas    Avoid dairy products    Limit caffeine and chocolate. No spices or seasonings " except salt.  DURING THE NEXT 24 HOURS  Slowly go back to a normal diet, as you feel better and your symptoms lessen.  FOLLOW UP with your doctor as advised if you are not improving over the next 2-3 days.  GET PROMPT MEDICAL ATTENTION if any of the following occur:    Constant abdominal pain that stays in the same spot or gets worse    Continued vomiting (unable to keep liquids down) for 24 hours    Frequent diarrhea (more than 5 times a day); blood (red or black color) in diarrhea    No urine output for 12 hours or extreme thirst    Weakness, dizziness or fainting    Unusually drowsy or confused    Fever over 101.0  F (38.3  C) for more than 3 days    Yellow color of the eyes or skin    7121-9952 [facility], [address] [place] [address], [place] [address]. All rights reserved. This information is not intended as a substitute for professional medical care. Always follow your healthcare professional's instructions.

## 2017-07-15 ENCOUNTER — HEALTH MAINTENANCE LETTER (OUTPATIENT)
Age: 62
End: 2017-07-15

## 2018-04-05 ENCOUNTER — HOSPITAL ENCOUNTER (EMERGENCY)
Facility: CLINIC | Age: 63
Discharge: HOME | End: 2018-04-05
Attending: EMERGENCY MEDICINE | Admitting: EMERGENCY MEDICINE
Payer: COMMERCIAL

## 2018-04-05 ENCOUNTER — TELEPHONE (OUTPATIENT)
Dept: BEHAVIORAL HEALTH | Facility: CLINIC | Age: 63
End: 2018-04-05

## 2018-04-05 ENCOUNTER — MEDICAL CORRESPONDENCE (OUTPATIENT)
Dept: HEALTH INFORMATION MANAGEMENT | Facility: CLINIC | Age: 63
End: 2018-04-05

## 2018-04-05 VITALS — DIASTOLIC BLOOD PRESSURE: 78 MMHG | OXYGEN SATURATION: 97 % | TEMPERATURE: 98.6 F | SYSTOLIC BLOOD PRESSURE: 126 MMHG

## 2018-04-05 DIAGNOSIS — F32.A DEPRESSION, UNSPECIFIED DEPRESSION TYPE: ICD-10-CM

## 2018-04-05 PROCEDURE — 99285 EMERGENCY DEPT VISIT HI MDM: CPT | Mod: 25

## 2018-04-05 PROCEDURE — 90791 PSYCH DIAGNOSTIC EVALUATION: CPT

## 2018-04-05 NOTE — ED AVS SNAPSHOT
Emergency Department    [address] [place] [address]    Phone:  [phone]    Fax:  [phone]                                       Maria T Suazo   MRN: [id number]    Department:   Emergency Department   Date of Visit:  4/5/2018           After Visit Summary Signature Page     I have received my discharge instructions, and my questions have been answered. I have discussed any challenges I see with this plan with the nurse or doctor.    ..........................................................................................................................................  Patient/Patient Representative Signature      ..........................................................................................................................................  Patient Representative Print Name and Relationship to Patient    ..................................................               ................................................  Date                                            Time    ..........................................................................................................................................  Reviewed by Signature/Title    ...................................................              ..............................................  Date                                                            Time

## 2018-04-05 NOTE — TELEPHONE ENCOUNTER
S:  4/5/18 April (ZARINA) called referring client to Adult [facility]  B:  Reported the client was sent to the ED for Suicidal Ideation,  by her therapist. Reported client stated she did have SI w/plan a   month ago, but denies SI with Intent or plan at this time, but have   had passive SI, wanting to go to sleep and not wake up. Reported   the client has a son that can stay with her, before he goes away on   a business trip.   A:  DA for PHP   R:  BEC assessment will be in [facility]. Pool message to Adult [facility] OP. JT

## 2018-04-05 NOTE — ED NOTES
Bed: BH3  Expected date:   Expected time:   Means of arrival:   Comments:  E1   63 F psych hold  1141

## 2018-04-05 NOTE — ED AVS SNAPSHOT
"  Emergency Department    [address] [facility] [address]    Phone:  [phone]    Fax:  [phone]                                       Maria T Suazo   MRN: [id number]    Department:   Emergency Department   Date of Visit:  4/5/2018           Patient Information     Date Of Birth          1955        Your diagnoses for this visit were:     Depression, unspecified depression type        You were seen by Renu Pierre MD.      Follow-up Information     Follow up with Ha Cook PA-C. Schedule an appointment as soon as possible for a visit in 3 days.    Specialty:  Orthopedics    Contact information:    [facility] ORTHO [facility]  [address]   [facility] [address]  [phone]          Discharge Instructions       *You may resume diet and activities as tolerated.  *No new medications. Continue your current medications as directed.  *Follow-up with the partial day program resources as given recommended by the DEC worker.  *Return if you develop thoughts of wanting to harm yourself or become worse in any way.        Know the Signs and Symptoms of Depression  Everyone feels down at times. The blues are a natural part of life. But an unhappy period that s intense or lasts for more than a couple of weeks can be a sign of depression.  Depression is a serious illness. It is not a sign of weakness or a \"character flaw,\" and it is not something you can \"snap out of.\" In fact, most people with depression need treatment to get better. Depression can disrupt the lives of family and friends. If you know someone you think may be depressed, find out what you can do to help.    Recognizing signs of depression  People who are depressed may:    Feel unhappy, sad, blue, down, or miserable nearly every day    Feel helpless, hopeless, or worthless    Lose interest in hobbies, friends, and activities that used to give pleasure    Not sleep well or sleep too much    Gain or lose " weight    Feel low on energy or constantly tired    Have a hard time concentrating or making decisions    Lose interest in sex    Have physical symptoms, such as stomachaches, headaches, or backaches  Know the serious signals  Never ignore a person's comments about suicide or behaviors that can lead to self-harm. Warning signals for suicide include:    Threats or talk of suicide    Statements such as  I won t be a problem much longer  or  Nothing matters     Giving away possessions or making a will or  arrangements    Buying a gun or other weapon    Sudden, unexplained cheerfulness or calm after a period of depression  If you notice any of these signs, get help right away. Call a healthcare professional, mental health clinic, or suicide hotline and ask what action to take. In an emergency, don t hesitate to call the police.  Resources:    National Institutes of Mental Zzgvgu682-673-7011nyq.[facility].nih.gov    National [place] on Mental Wsouqwp977-068-3942ldm.kamryn.org     Mental Health Npautqb409-012-1647nup.[facility].org    National Suicide Aqshtjj921-864-4020 (800-SUICIDE)    National Suicide Prevention Qicalnvd779-078-4767 (579-103-GEON)www.suicidepreventionlifeline.org   Date Last Reviewed: 2017-2017 [employer]. [address] [place] [address]. All rights reserved. This information is not intended as a substitute for professional medical care. Always follow your healthcare professional's instructions.            24 Hour Appointment Hotline       To make an appointment at any [facility], call [phone] ([phone]). If you don't have a family doctor or clinic, we will help you find one. [facility] clinics are conveniently located to serve the needs of you and your family.             Review of your medicines      Our records show that you are taking the medicines listed below. If these are incorrect, please call your family doctor or clinic.        Dose / Directions  Last dose taken    ASPIRIN PO   Dose:  81 mg        Take 81 mg by mouth   Refills:  0        BIOTIN PO   Dose:  78845 mg        Take 10,000 mg by mouth daily   Refills:  0        ibuprofen 800 MG tablet   Commonly known as:  ADVIL/MOTRIN   Dose:  400 mg        Take 400 mg by mouth every 8 hours as needed (headache)   Refills:  0        lisinopril 10 MG tablet   Commonly known as:  PRINIVIL/ZESTRIL   Dose:  10 mg        Take 10 mg by mouth daily   Refills:  0        MELATONIN PO   Dose:  3 mg        Take 3 mg by mouth nightly as needed   Refills:  0        metFORMIN 500 MG tablet   Commonly known as:  GLUCOPHAGE   Dose:  500 mg        Take 500 mg by mouth 2 times daily (with meals)   Refills:  0        metoclopramide 10 MG tablet   Commonly known as:  REGLAN   Dose:  10 mg   Quantity:  20 tablet        Take 1 tablet (10 mg) by mouth 4 times daily as needed   Refills:  0        NORTRIPTYLINE HCL PO   Dose:  20-30 mg        Take 20-30 mg by mouth At Bedtime   Refills:  0        * order for DME   Quantity:  1 Units        Equipment being ordered: raised toilet seat.   Refills:  0        * order for DME   Quantity:  1 Units        Equipment being ordered: Shower chair   Refills:  0        * order for DME   Quantity:  4 Device        Equipment being ordered: Compression Socks Knee High (Closed or Open Toe) 20-30 mmhg   Refills:  0        ROSUVASTATIN CALCIUM PO   Dose:  20 mg        Take 20 mg by mouth daily   Refills:  0        venlafaxine 150 MG 24 hr capsule   Commonly known as:  EFFEXOR-XR   Dose:  150 mg        Take 150 mg by mouth daily   Refills:  0        VITAMIN C & D3/ABIODUN HIPS PO        Take by mouth daily   Refills:  0        VITAMIN D PO   Dose:  1000 mg        Take 1,000 mg by mouth daily   Refills:  0        zinc 50 MG Tabs   Dose:  1 tablet        Take 1 tablet by mouth daily as needed   Refills:  0        * Notice:  This list has 3 medication(s) that are the same as other medications prescribed for you.  Read the directions carefully, and ask your doctor or other care provider to review them with you.            Orders Needing Specimen Collection     None      Pending Results     No orders found from 4/3/2018 to 4/6/2018.            Pending Culture Results     No orders found from 4/3/2018 to 4/6/2018.            Pending Results Instructions     If you had any lab results that were not finalized at the time of your Discharge, you can call the ED Lab Result RN at [phone]. You will be contacted by this team for any positive Lab results or changes in treatment. The nurses are available 7 days a week from 10A to 6:30P.  You can leave a message 24 hours per day and they will return your call.        Test Results From Your Hospital Stay               Clinical Quality Measure: Blood Pressure Screening     Your blood pressure was checked while you were in the emergency department today. The last reading we obtained was  BP: 126/81 . Please read the guidelines below about what these numbers mean and what you should do about them.  If your systolic blood pressure (the top number) is less than 120 and your diastolic blood pressure (the bottom number) is less than 80, then your blood pressure is normal. There is nothing more that you need to do about it.  If your systolic blood pressure (the top number) is 120-139 or your diastolic blood pressure (the bottom number) is 80-89, your blood pressure may be higher than it should be. You should have your blood pressure rechecked within a year by a primary care provider.  If your systolic blood pressure (the top number) is 140 or greater or your diastolic blood pressure (the bottom number) is 90 or greater, you may have high blood pressure. High blood pressure is treatable, but if left untreated over time it can put you at risk for heart attack, stroke, or kidney failure. You should have your blood pressure rechecked by a primary care provider within the next 4 weeks.  If your  provider in the emergency department today gave you specific instructions to follow-up with your doctor or provider even sooner than that, you should follow that instruction and not wait for up to 4 weeks for your follow-up visit.        Thank you for choosing [facility]       Thank you for choosing [facility] for your care. Our goal is always to provide you with excellent care. Hearing back from our patients is one way we can continue to improve our services. Please take a few minutes to complete the written survey that you may receive in the mail after you visit with us. Thank you!        [employer] Information     [employer] gives you secure access to your electronic health record. If you see a primary care provider, you can also send messages to your care team and make appointments. If you have questions, please call your primary care clinic.  If you do not have a primary care provider, please call [phone] and they will assist you.        Care EveryWhere ID     This is your Care EveryWhere ID. This could be used by other organizations to access your [facility] medical records  [id number]        Equal Access to Services     SHAHEEN EDDY : Hadii jt popeo Sodavid, waaxda luqadaha, qaybta kaalmabuck hart, ne alan . So [facility] [phone].    ATENCIÓN: Si habla español, tiene a demarco disposición servicios gratuitos de asistencia lingüística. Llame al [phone].    We comply with applicable federal civil rights laws and [place] laws. We do not discriminate on the basis of race, color, national origin, age, disability, sex, sexual orientation, or gender identity.            After Visit Summary       This is your record. Keep this with you and show to your community pharmacist(s) and doctor(s) at your next visit.

## 2018-04-05 NOTE — DISCHARGE INSTRUCTIONS
"*You may resume diet and activities as tolerated.  *No new medications. Continue your current medications as directed.  *Follow-up with the partial day program resources as given recommended by the DEC worker.  *Return if you develop thoughts of wanting to harm yourself or become worse in any way.        Know the Signs and Symptoms of Depression  Everyone feels down at times. The blues are a natural part of life. But an unhappy period that s intense or lasts for more than a couple of weeks can be a sign of depression.  Depression is a serious illness. It is not a sign of weakness or a \"character flaw,\" and it is not something you can \"snap out of.\" In fact, most people with depression need treatment to get better. Depression can disrupt the lives of family and friends. If you know someone you think may be depressed, find out what you can do to help.    Recognizing signs of depression  People who are depressed may:    Feel unhappy, sad, blue, down, or miserable nearly every day    Feel helpless, hopeless, or worthless    Lose interest in hobbies, friends, and activities that used to give pleasure    Not sleep well or sleep too much    Gain or lose weight    Feel low on energy or constantly tired    Have a hard time concentrating or making decisions    Lose interest in sex    Have physical symptoms, such as stomachaches, headaches, or backaches  Know the serious signals  Never ignore a person's comments about suicide or behaviors that can lead to self-harm. Warning signals for suicide include:    Threats or talk of suicide    Statements such as  I won t be a problem much longer  or  Nothing matters     Giving away possessions or making a will or  arrangements    Buying a gun or other weapon    Sudden, unexplained cheerfulness or calm after a period of depression  If you notice any of these signs, get help right away. Call a healthcare professional, mental health clinic, or suicide hotline and ask what action to " take. In an emergency, don t hesitate to call the police.  Resources:    National Institutes of Mental Drdhtc350-907-1911dac.[facility].nih.gov    National [place] on Mental Zmrxqnz780-502-7208bhb.kamryn.org     Mental Health Voybnjb196-211-5881uyu.[facility].org    National Suicide Avzxiyd166-569-3600 (800-SUICIDE)    National Suicide Prevention Gghxgkug919-190-5595 (006-626-KDBQ)www.suicidepreventionlifeline.org   Date Last Reviewed: 1/1/2017 2000-2017 [employer]. [address] [place] [address]. All rights reserved. This information is not intended as a substitute for professional medical care. Always follow your healthcare professional's instructions.

## 2018-04-05 NOTE — ED PROVIDER NOTES
History     Chief Complaint:  Suicidal thoughts    HPI   Maria T Suazo is a 63 year old female with a history of depression, seizures, diabetes mellitus, HTN, hyperlipidemia, and migraines  who presents to the emergency department for evaluation of suicidal ideation. Of note, the patient was recently diagnosed with breast cancer. Earlier today while meeting with her psychologist, the patient was discussing her worsening depression given her recent cancer diagnosis.  Her psychologist started talking about admission for further treatment and she left because she wanted to make her radiology appointment this afternoon at [facility].  His concern was apparently she has a history of prior thoughts of self harm and suicidal ideation, which including thoughts about hanging herself or driving off a denilson. This was over a year ago.  She denies having these thoughts or intentions at this time.  Her son spoke with her psychologist who began signing papers to place the patient on a hold and PD brought her to the ED.  Her psychologist also called to cancel the patient's appointment for a breast MRI, which was originally scheduled for later today. Police went to the patient's house and brought here to [facility] rather than to [facility], where the patient normally receives care secondary to police being unsure of whether [facility] had psych facilities. Police have also been in contact with the patient's son, who is currently working remotely so he can be living with his mother so she is not alone and he has been doing so for the past week. Police report the son seems like a reliable resource for the patient.   Her son did arrive in the ED.  He lives in [place] but has been working here to provide support to her in light of the recent diagnosis.  His concern is that he has an international trip in a few weeks and wants to make sure she is on the road to recovery.     Here, the patient denies thoughts of self harm and suicidal  ideation today but does attest to her increasing depression and feeling like she is numb and it would be ok if she didn't wake up. She denies doing anything to harm herself and denies taking anything. She admits to those thoughts in the past but is not actively having suicidal thoughts now. No history of previous hospitalization for psychiatric illness. She reports she had medication increase 4 days ago with her venlafaxine dosage increased from 2 pills of 75 mg to now taking 3 pills of 75 mg. She states she is very upset about having to miss her radiology appointment later today.       Allergies:  NKDA     Medications:    Reglan  Rosuvastatin  Venlafaxine  Biotin  Melatonin  Zinc  Vitamin D  Aspirin  Nortriptyline  Lisinopril  metformin  Vitamina C-Cholecalciferol-Zena     Past Medical History:    Depression  Aneurysm  diabetes mellitus  High cholesterol  Blood transfusion  Migraines  Seizures  Sleep apnea  Asthma    Past Surgical History:    Bunionectomy  Left breast lumpectomy  Left ankle fracture surgery  Right foot osteotomy    Family History:    No past pertinent family history.    Social History:  Negative for tobacco use.  Negative for alcohol use.  Patient resides at her house with her son  Marital Status:        Review of Systems   Psychiatric/Behavioral: Negative for self-injury and suicidal ideas.        Positive for increasing depression   All other systems reviewed and are negative.    Physical Exam   First Vitals:  BP: 126/81  Heart Rate: 86  Temp: 98.6  F (37  C)  SpO2: 98 %    Physical Exam  General: Well-nourished, no acute distress  Eyes: PERRL, conjunctivae pink no scleral icterus or conjunctival injection  ENT:  Moist mucus membranes  Respiratory:  No respiratory distress  CV: Normal rate   Skin: Warm, dry.  No rashes or petechiae  Musculoskeletal: No peripheral edema or calf tenderness  Neuro: Alert and oriented to person/place/time  Physical appearance, attire: Tearful at times. Appears  stated age. Hygiene well groomed. Eye contact at examiner. Speech regular, speech volume regular, speech quality fluid. Cognitive, perceptual reality based. Cognition, memory intact, judgment intact, insight intact. Orientation to time, place, person and situation. Thought logical. Hallucinations: None. General behavioral tone: Cooperative. Psychomotor activity: No problem noted. Gait: No problem. Mood sad. Affect congruent and appropriate.     Emergency Department Course   Emergency Department Course:  1200 Nursing notes and vitals reviewed.  I performed an exam of the patient as documented above.     1206 I consulted with DEC regarding the patient's history and presentation here in the emergency department.     DEC evaluated the patient here in the ED.  I discussed with the DEC .    1330 I rechecked the patient and discussed the results of her workup thus far.     Findings and plan explained to the Patient. Patient discharged home with instructions regarding supportive care, medications, and reasons to return. The importance of close follow-up was reviewed.     I personally answered all related questions prior to discharge.     Impression & Plan    Medical Decision Making:  Maria T Suazo is a 63 year old female who presents for evaluation of depression with a history of suicidal ideation in the past.  This patient does have a history of previous psychiatric illness.  The patient has had increasing depression. Although the patient has had increased depressive symptoms, she does not had any actions of self harms.  They no signs at this point of suicide attempt or ingestion.  She is motivated and actively trying to take care of herself given her recent cancer diagnosis.  Her son is with her and able to watch over her at home.  DEC was able to arrange for a partial day program and they both feel comfortable with this plan.  She is able to contract for safety.  The patient was given resources and will follow up  as instructed.    Critical Care time:  none    Diagnosis:    ICD-10-CM    1. Depression, unspecified depression type F32.9        Disposition:  discharged to home    Discharge Medications:  Current Discharge Medication List        IAwilda, am serving as a scribe on 4/5/2018 at 12:08 PM to personally document services performed by Renu Pierre MD based on my observations and the provider's statements to me.       Awilda Langford  4/5/2018    EMERGENCY DEPARTMENT       Renu Pierre MD  04/05/18 1733

## 2018-04-06 ENCOUNTER — TELEPHONE (OUTPATIENT)
Dept: BEHAVIORAL HEALTH | Facility: CLINIC | Age: 63
End: 2018-04-06

## 2018-04-06 NOTE — TELEPHONE ENCOUNTER
Faye from [facility] called following up on [facility] PHP Referral.  I transferred her MH Adult DA Scheduling.  It appears the referral was made yesterday. fb

## 2018-04-06 NOTE — TELEPHONE ENCOUNTER
D: Received referral from ED/[facility] to PHP. Notes indicate pt was pursuing PHP at [facility]. However, DEC  spoke with pt about PHP and 55+ at  and pt agreed to referral.    I: Reviewed EMR. Called pt at number provided, however, outgoing message says the mailbox is full.     A: Pending    P: Await contact from pt and will attempt another call to pt at a later time.    Mariajose Jacob, ADRIANA, [facility]  4/6/2018

## 2018-04-11 NOTE — TELEPHONE ENCOUNTER
Several unsuccessful attempts made to contact patient to schedule assessment.  Updated [facility] regarding attempts and will wait for patient to contact us to schedule if she is interested in programming.

## 2019-11-03 ENCOUNTER — HEALTH MAINTENANCE LETTER (OUTPATIENT)
Age: 64
End: 2019-11-03

## 2020-02-10 ENCOUNTER — HEALTH MAINTENANCE LETTER (OUTPATIENT)
Age: 65
End: 2020-02-10

## 2020-11-16 ENCOUNTER — HEALTH MAINTENANCE LETTER (OUTPATIENT)
Age: 65
End: 2020-11-16

## 2021-04-03 ENCOUNTER — HEALTH MAINTENANCE LETTER (OUTPATIENT)
Age: 66
End: 2021-04-03

## 2021-09-18 ENCOUNTER — HEALTH MAINTENANCE LETTER (OUTPATIENT)
Age: 66
End: 2021-09-18

## 2021-11-13 ENCOUNTER — HEALTH MAINTENANCE LETTER (OUTPATIENT)
Age: 66
End: 2021-11-13

## 2021-12-21 ENCOUNTER — TELEPHONE (OUTPATIENT)
Dept: ORTHOPEDICS | Facility: CLINIC | Age: 66
End: 2021-12-21
Payer: COMMERCIAL

## 2021-12-21 NOTE — TELEPHONE ENCOUNTER
"Left message asking for a return call.   Asked if patient was having surgery. Not sure we have the exact information they need. Triage number provided.     Per chart review, \"Three 2.7 lag screws\" were used for right great toe bunionectomy on 8/30/16.     CELINE Higginbotham RN    "

## 2021-12-21 NOTE — LETTER
December 29, 2021      Maria T Suazo  [address] [place] [address]        Dear Maria T,     We have made 3 attempts to contact you regarding a call from [facility] inquiring about the previous surgical hardware used for your foot surgery in 2016 .  Please contact the clinic for further information or schedule an appointment to further discuss if you still need assistance.     Sincerely,        Abisai Marie MD

## 2021-12-29 NOTE — TELEPHONE ENCOUNTER
There is a consent to communicate on file.     Attempted to reach patient and it went directly to voicemail and the mailbox is full.   Letter sent to patient as this was the 3rd attempt.    CELINE Higginbotham RN

## 2022-04-30 ENCOUNTER — HEALTH MAINTENANCE LETTER (OUTPATIENT)
Age: 67
End: 2022-04-30

## 2022-11-19 ENCOUNTER — HEALTH MAINTENANCE LETTER (OUTPATIENT)
Age: 67
End: 2022-11-19

## 2023-06-01 ENCOUNTER — HEALTH MAINTENANCE LETTER (OUTPATIENT)
Age: 68
End: 2023-06-01

## 2023-11-19 ENCOUNTER — HEALTH MAINTENANCE LETTER (OUTPATIENT)
Age: 68
End: 2023-11-19

## 2024-06-16 ENCOUNTER — HEALTH MAINTENANCE LETTER (OUTPATIENT)
Age: 69
End: 2024-06-16